# Patient Record
Sex: FEMALE | Race: WHITE | Employment: OTHER | URBAN - METROPOLITAN AREA
[De-identification: names, ages, dates, MRNs, and addresses within clinical notes are randomized per-mention and may not be internally consistent; named-entity substitution may affect disease eponyms.]

---

## 2019-01-28 ENCOUNTER — THERAPY VISIT (OUTPATIENT)
Dept: PHYSICAL THERAPY | Facility: CLINIC | Age: 64
End: 2019-01-28
Payer: MEDICARE

## 2019-01-28 DIAGNOSIS — M25.512 ACUTE PAIN OF LEFT SHOULDER: ICD-10-CM

## 2019-01-28 PROCEDURE — 97110 THERAPEUTIC EXERCISES: CPT | Mod: GP | Performed by: PHYSICAL THERAPIST

## 2019-01-28 PROCEDURE — 97161 PT EVAL LOW COMPLEX 20 MIN: CPT | Mod: GP | Performed by: PHYSICAL THERAPIST

## 2019-01-28 NOTE — LETTER
DEPARTMENT OF HEALTH AND HUMAN SERVICES  [facility] FOR MEDICARE & MEDICAID SERVICES    PLAN/UPDATED PLAN OF PROGRESS FOR OUTPATIENT REHABILITATION    PATIENTS NAME:  Amy Treadwell     : 1955    PROVIDER NUMBER:    [id number]    HICN:      PROVIDER NAME: [place] [facility]    MEDICAL RECORD NUMBER: [id number]     START OF CARE DATE:  SOC Date: 19   TYPE:  PT    PRIMARY/TREATMENT DIAGNOSIS: (Pertinent Medical Diagnosis)  Acute pain of left shoulder    VISITS FROM START OF CARE:  Rxs Used: 1     [place] [facility] Initial Evaluation  Subjective:  The history is provided by the patient. No  was used.   Amy Treadwell is a 63 year old female with a left shoulder condition.  Condition occurred with:  A fall.  Condition occurred: at home.  This is a new condition  19- was putting deicer on driveway and slipped on ice. Unsure of specific way she fell, but thinks she caught self with forearms. Felt most pain in L shoulder/upper arm at this time. Had X-ray which came back negative fractures. .    Patient reports pain:  Upper arm, scapular area and anterior.    Pain is described as aching and is constant and reported as 3/10 and 6/10.  Associated symptoms:  Loss of motion/stiffness and catching. Pain is the same all the time.  Symptoms are exacerbated by using arm at shoulder level, using arm behind back, using arm overhead, lying on extremity, lifting and certain positions and relieved by analgesics and heat.  Since onset symptoms are gradually improving.  Special tests:  X-ray.      General health as reported by patient is good.  Pertinent medical history includes:  Overweight, high blood pressure, thyroid problems and migraines/headaches.  Medical allergies: yes (Adhesive).  Other surgeries include:  Other.  Current medications:  Thyroid medication, high blood pressure medication and other.  Current occupation is Retired;  (Previously ER registration); Lives at home alone w/ 1 dog; Maintains house independently (including hauling salt, climbing up/down stairs and ladders).        Barriers include:  Lives alone.    Red flags:  None as reported by the patient.                Objective:  Standing Alignment:    Cervical/Thoracic:  Forward head  Shoulder/UE:  Rounded shoulders        Shoulder Evaluation:  ROM:  AROM:    Flexion:  Left:  140 ++pain    Right:  155  Extension: Left: 35 ++pullRight: 70  Abduction:  Left: 70 ++pain   Right:  155  External Rotation:  Left:  75 +pull    Right:  75  Flexion/External Rotation:  Left:  Back of neck (slow motion ++pain)    Right:  WNL  Extension/Internal Rotation:  Left:  T10    Right:  T8      Strength:    Flexion: Left:3+/5    Pain: ++    Right: 5/5     Pain:   Abduction:  Left: 3+/5   Pain:++    Right: 4+/5     Pain:  Internal Rotation:  Left:5/5     Pain:    Right: 5/5     Pain:  External Rotation:   Left:3+/5      Pain:+   Right:4+/5     Pain:      Special Tests:    Left shoulder positive for the following special tests:  Rotator cuff tear (Empty can 3/5 ++pain)  Right shoulder negative for the following special tests:Rotator cuff tear    Palpation:    Left shoulder tenderness present at:  Biceps and Levator    Mobility Tests:  Mobility wnl shoulder: Abdnormal Scapulohumeral rhythm L.    Assessment/Plan:    Patient is a 63 year old female with left side shoulder complaints.    Patient has the following significant findings with corresponding treatment plan.                Diagnosis 1:  L Shoulder pain  Pain -  hot/cold therapy, US, electric stimulation, manual therapy, splint/taping/bracing/orthotics, self management, education, directional preference exercise and home program  Decreased ROM/flexibility - manual therapy, therapeutic exercise, therapeutic activity and home program  Decreased strength - therapeutic exercise, therapeutic activities and home program  Inflammation - cold therapy, US  and self management/home program  Impaired muscle performance - neuro re-education and home program  Decreased function - therapeutic activities and home program  Impaired posture - neuro re-education, therapeutic activities and home program    Therapy Evaluation Codes:   1) History comprised of:   Personal factors that impact the plan of care:      Age, Living environment, Past/current experiences and Time since onset of symptoms.    Comorbidity factors that impact the plan of care are:      High blood pressure, Migraines/headaches, Numbness/tingling and Overweight.     Medications impacting care: High blood pressure.  2) Examination of Body Systems comprised of:   Body structures and functions that impact the plan of care:      Shoulder.     Activity limitations that impact the plan of care are:      Bathing, Cooking, Driving, Dressing, Grasping, Lifting, Sleeping and Laying down.  3) Clinical presentation characteristics are:   Stable/Uncomplicated.  4) Decision-Making    Low complexity using standardized patient assessment instrument and/or measureable assessment of functional outcome.  Cumulative Therapy Evaluation is: Low complexity.    Previous and current functional limitations:  (See Goal Flow Sheet for this information)    Short term and Long term goals: (See Goal Flow Sheet for this information)     Communication ability:  Patient appears to be able to clearly communicate and understand verbal and written communication and follow directions correctly.  Treatment Explanation - The following has been discussed with the patient:   RX ordered/plan of care  Anticipated outcomes  Possible risks and side effects  This patient would benefit from PT intervention to resume normal activities.   Rehab potential is good.    Frequency:  2 X week, once daily  Duration:  for 2 weeks tapering to 1 X a week over 2 weeks  Discharge Plan:  Achieve all LTG.  Independent in home treatment program.  Reach maximal therapeutic  "benefit.    Caregiver Signature/Credentials _____________________________ Date ________       Treating Provider: Amanda Hilligoss, DPT   I have reviewed and certified the need for these services and plan of treatment while under my care.        PHYSICIAN'S SIGNATURE:   _________________________________________  Date___________   Woody Champion MD    Certification period:  Beginning of Cert date period: 01/28/19 to  End of Cert period date: 04/01/19     Functional Level Progress Report: Please see attached \"Goal Flow sheet for Functional level.\"    ____X____ Continue Services or       ________ DC Services                Service dates: From  SOC Date: 01/28/19 date to present                         "

## 2019-01-28 NOTE — PROGRESS NOTES
[facility] Initial Evaluation  Subjective:  The history is provided by the patient. No  was used.   Amy Treadwell is a 63 year old female with a left shoulder condition.  Condition occurred with:  A fall.  Condition occurred: at home.  This is a new condition  1/1/19- was putting deicer on driveway and slipped on ice. Unsure of specific way she fell, but thinks she caught self with forearms. Felt most pain in L shoulder/upper arm at this time. Had X-ray which came back negative fractures. .    Patient reports pain:  Upper arm, scapular area and anterior.    Pain is described as aching and is constant and reported as 3/10 and 6/10.  Associated symptoms:  Loss of motion/stiffness and catching. Pain is the same all the time.  Symptoms are exacerbated by using arm at shoulder level, using arm behind back, using arm overhead, lying on extremity, lifting and certain positions and relieved by analgesics and heat.  Since onset symptoms are gradually improving.  Special tests:  X-ray.      General health as reported by patient is good.  Pertinent medical history includes:  Overweight, high blood pressure, thyroid problems and migraines/headaches.  Medical allergies: yes (Adhesive).  Other surgeries include:  Other.  Current medications:  Thyroid medication, high blood pressure medication and other.  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        Barriers include:  Lives alone.    Red flags:  None as reported by the patient.                        Objective:  Standing Alignment:    Cervical/Thoracic:  Forward head  Shoulder/UE:  Rounded shoulders                                       Shoulder Evaluation:  ROM:  AROM:    Flexion:  Left:  140 ++pain    Right:  155  Extension: Left: 35 ++pullRight: 70  Abduction:  Left: 70 ++pain   Right:   155      External Rotation:  Left:  75 +pull    Right:  75          Flexion/External Rotation:  Left:  Back of neck (slow motion ++pain)    Right:  WNL  Extension/Internal Rotation:  Left:  T10    Right:  T8          Strength:    Flexion: Left:3+/5    Pain: ++    Right: 5/5     Pain:     Abduction:  Left: 3+/5   Pain:++    Right: 4+/5     Pain:    Internal Rotation:  Left:5/5     Pain:    Right: 5/5     Pain:  External Rotation:   Left:3+/5      Pain:+   Right:4+/5     Pain:              Special Tests:    Left shoulder positive for the following special tests:  Rotator cuff tear (Empty can 3/5 ++pain)    Right shoulder negative for the following special tests:Rotator cuff tear  Palpation:    Left shoulder tenderness present at:  Biceps and Levator    Mobility Tests:  Mobility wnl shoulder: Abdnormal Scapulohumeral rhythm L.                                                 General     ROS    Assessment/Plan:    Patient is a 63 year old female with left side shoulder complaints.    Patient has the following significant findings with corresponding treatment plan.                Diagnosis 1:  L Shoulder pain  Pain -  hot/cold therapy, US, electric stimulation, manual therapy, splint/taping/bracing/orthotics, self management, education, directional preference exercise and home program  Decreased ROM/flexibility - manual therapy, therapeutic exercise, therapeutic activity and home program  Decreased strength - therapeutic exercise, therapeutic activities and home program  Inflammation - cold therapy, US and self management/home program  Impaired muscle performance - neuro re-education and home program  Decreased function - therapeutic activities and home program  Impaired posture - neuro re-education, therapeutic activities and home program    Therapy Evaluation Codes:   1) History comprised of:   Personal factors that impact the plan of care:      Age, Living environment, Past/current experiences and Time since onset of  symptoms.    Comorbidity factors that impact the plan of care are:      High blood pressure, Migraines/headaches, Numbness/tingling and Overweight.     Medications impacting care: High blood pressure.  2) Examination of Body Systems comprised of:   Body structures and functions that impact the plan of care:      Shoulder.   Activity limitations that impact the plan of care are:      Bathing, Cooking, Driving, Dressing, Grasping, Lifting, Sleeping and Laying down.  3) Clinical presentation characteristics are:   Stable/Uncomplicated.  4) Decision-Making    Low complexity using standardized patient assessment instrument and/or measureable assessment of functional outcome.  Cumulative Therapy Evaluation is: Low complexity.    Previous and current functional limitations:  (See Goal Flow Sheet for this information)    Short term and Long term goals: (See Goal Flow Sheet for this information)     Communication ability:  Patient appears to be able to clearly communicate and understand verbal and written communication and follow directions correctly.  Treatment Explanation - The following has been discussed with the patient:   RX ordered/plan of care  Anticipated outcomes  Possible risks and side effects  This patient would benefit from PT intervention to resume normal activities.   Rehab potential is good.    Frequency:  2 X week, once daily  Duration:  for 2 weeks tapering to 1 X a week over 2 weeks  Discharge Plan:  Achieve all LTG.  Independent in home treatment program.  Reach maximal therapeutic benefit.    Please refer to the daily flowsheet for treatment today, total treatment time and time spent performing 1:1 timed codes.

## 2019-01-30 ENCOUNTER — THERAPY VISIT (OUTPATIENT)
Dept: PHYSICAL THERAPY | Facility: CLINIC | Age: 64
End: 2019-01-30
Payer: MEDICARE

## 2019-01-30 DIAGNOSIS — M25.512 ACUTE PAIN OF LEFT SHOULDER: ICD-10-CM

## 2019-01-30 PROCEDURE — 97140 MANUAL THERAPY 1/> REGIONS: CPT | Mod: GP | Performed by: PHYSICAL THERAPIST

## 2019-01-30 PROCEDURE — 97110 THERAPEUTIC EXERCISES: CPT | Mod: GP | Performed by: PHYSICAL THERAPIST

## 2019-02-11 ENCOUNTER — THERAPY VISIT (OUTPATIENT)
Dept: PHYSICAL THERAPY | Facility: CLINIC | Age: 64
End: 2019-02-11
Payer: MEDICARE

## 2019-02-11 DIAGNOSIS — M25.512 ACUTE PAIN OF LEFT SHOULDER: ICD-10-CM

## 2019-02-11 PROCEDURE — 97110 THERAPEUTIC EXERCISES: CPT | Mod: GP | Performed by: PHYSICAL THERAPIST

## 2019-02-11 PROCEDURE — 97140 MANUAL THERAPY 1/> REGIONS: CPT | Mod: GP | Performed by: PHYSICAL THERAPIST

## 2019-02-18 ENCOUNTER — THERAPY VISIT (OUTPATIENT)
Dept: PHYSICAL THERAPY | Facility: CLINIC | Age: 64
End: 2019-02-18
Payer: MEDICARE

## 2019-02-18 DIAGNOSIS — M25.512 ACUTE PAIN OF LEFT SHOULDER: ICD-10-CM

## 2019-02-18 PROCEDURE — 97110 THERAPEUTIC EXERCISES: CPT | Mod: GP | Performed by: PHYSICAL THERAPIST

## 2019-02-18 PROCEDURE — 97140 MANUAL THERAPY 1/> REGIONS: CPT | Mod: GP | Performed by: PHYSICAL THERAPIST

## 2019-02-25 ENCOUNTER — THERAPY VISIT (OUTPATIENT)
Dept: PHYSICAL THERAPY | Facility: CLINIC | Age: 64
End: 2019-02-25
Payer: MEDICARE

## 2019-02-25 DIAGNOSIS — M25.512 ACUTE PAIN OF LEFT SHOULDER: ICD-10-CM

## 2019-02-25 PROCEDURE — 97110 THERAPEUTIC EXERCISES: CPT | Mod: GP | Performed by: PHYSICAL THERAPIST

## 2019-02-25 PROCEDURE — 97140 MANUAL THERAPY 1/> REGIONS: CPT | Mod: GP | Performed by: PHYSICAL THERAPIST

## 2019-02-25 PROCEDURE — 97112 NEUROMUSCULAR REEDUCATION: CPT | Mod: GP | Performed by: PHYSICAL THERAPIST

## 2019-03-04 ENCOUNTER — THERAPY VISIT (OUTPATIENT)
Dept: PHYSICAL THERAPY | Facility: CLINIC | Age: 64
End: 2019-03-04
Payer: MEDICARE

## 2019-03-04 DIAGNOSIS — M25.512 ACUTE PAIN OF LEFT SHOULDER: ICD-10-CM

## 2019-03-04 PROCEDURE — 97112 NEUROMUSCULAR REEDUCATION: CPT | Mod: GP | Performed by: PHYSICAL THERAPIST

## 2019-03-04 PROCEDURE — 97110 THERAPEUTIC EXERCISES: CPT | Mod: GP | Performed by: PHYSICAL THERAPIST

## 2019-03-04 NOTE — PROGRESS NOTES
PROGRESS  REPORT    Progress reporting period is from 1/28/19 to 3/4/19.       SUBJECTIVE  Pt states she has not had much time for HEP due to caring for her dog and doing home tasks such as shoveling. Does not feel much pain w/ shoulder this AM, but has also not used it much.  Does continue to have soreness and intermittent sharp pains w/ certain reaching motions. Plans to return to PCP for additional testing.    Current Pain level: 1/10.     Initial Pain level: 7/10.   Changes in function:  Yes (See Goal flowsheet attached for changes in current functional level)  Adverse reaction to treatment or activity: None    OBJECTIVE  AROM: Flex 150 +pain, Ext 50 ++pull anterior shoulder, Abd 150 ++pain mid range, After repeated shoulder ext: Abd L 155 +pain end range     SPADI: 11.82 (improved from initial score of 50)    ASSESSMENT/PLAN  Updated problem list and treatment plan: Diagnosis 1:  R Shoulder pain  Pain -  self management and home program  Decreased ROM/flexibility - home program  Decreased strength - home program  Impaired muscle performance - home program  Decreased function - home program  Impaired posture - home program  STG/LTGs have been met or progress has been made towards goals:  Yes (See Goal flow sheet completed today.)  Assessment of Progress: The patient's condition is improving.  Patient is meeting short term goals and is progressing towards long term goals.  Self Management Plans:  Patient has been instructed in a home treatment program.  Patient  has been instructed in self management of symptoms.  I have re-evaluated this patient and find that the nature, scope, duration and intensity of the therapy is appropriate for the medical condition of the patient.  Amy continues to require the following intervention to meet STG and LTG's:  PT    Recommendations:  This patient would benefit from further evaluation.  Pending PCP recommendation, this patient would benefit from continued therapy.      Frequency:  1 X week, once daily  Duration:  for 4 weeks      Please refer to the daily flowsheet for treatment today, total treatment time and time spent performing 1:1 timed codes.

## 2019-03-04 NOTE — LETTER
[facility] PH[facility]  [address]  [place] [id number] [phone]    2019    Re: Amy Treadwell   :   1955  MRN:  [id number]   REFERRING PHYSICIAN:   Woody Champion    [facility]  Date of Initial Evaluation:  19  Visits:  Rxs Used: 6  Reason for Referral:  Acute pain of left shoulder    PROGRESS  REPORT  Progress reporting period is from 19 to 3/4/19.       SUBJECTIVE  Pt states she has not had much time for HEP due to caring for her dog and doing home tasks such as shoveling. Does not feel much pain w/ shoulder this AM, but has also not used it much.  Does continue to have soreness and intermittent sharp pains w/ certain reaching motions. Plans to return to PCP for additional testing.    Current Pain level: 1/10.     Initial Pain level: 7/10.   Changes in function:  Yes (See Goal flowsheet attached for changes in current functional level)  Adverse reaction to treatment or activity: None    OBJECTIVE  AROM: Flex 150 +pain, Ext 50 ++pull anterior shoulder, Abd 150 ++pain mid range, After repeated shoulder ext: Abd L 155 +pain end range     SPADI: 11.82 (improved from initial score of 50)    ASSESSMENT/PLAN  Updated problem list and treatment plan: Diagnosis 1:  R Shoulder pain  Pain -  self management and home program  Decreased ROM/flexibility - home program  Decreased strength - home program  Impaired muscle performance - home program  Decreased function - home program  Impaired posture - home program  STG/LTGs have been met or progress has been made towards goals:  Yes (See Goal flow sheet completed today.)  Assessment of Progress: The patient's condition is improving.  Patient is meeting short term goals and is progressing towards long term goals.  Self Management Plans:  Patient has been instructed in a home treatment program.  Patient  has been instructed in self management of  symptoms.  I have re-evaluated this patient and find that the nature, scope, duration and intensity of the therapy is appropriate for the medical condition of the patient.  Amy continues to require the following intervention to meet STG and LTG's:  PT    Recommendations:  This patient would benefit from further evaluation.  Pending PCP recommendation, this patient would benefit from continued therapy.     Frequency:  1 X week, once daily  Duration:  for 4 weeks        Thank you for your referral.    INQUIRIES  Therapist: Amanda Hilligoss DPT  [facility]  [address] [place] [address] [address]  [facility] [address]  Phone: [phone]  Fax: [phone]

## 2019-04-11 PROBLEM — M25.512 LEFT SHOULDER PAIN: Status: RESOLVED | Noted: 2019-01-28 | Resolved: 2019-04-11

## 2019-04-11 NOTE — PROGRESS NOTES
Patient did not return for further treatment and no additional progress was noted.  Please refer to the progress note and goal flowsheet completed on 03/04/19 for discharge information.